# Patient Record
Sex: FEMALE | Race: WHITE | NOT HISPANIC OR LATINO | Employment: OTHER | URBAN - NONMETROPOLITAN AREA
[De-identification: names, ages, dates, MRNs, and addresses within clinical notes are randomized per-mention and may not be internally consistent; named-entity substitution may affect disease eponyms.]

---

## 2017-08-01 ENCOUNTER — LAB REQUISITION (OUTPATIENT)
Dept: LAB | Facility: HOSPITAL | Age: 72
End: 2017-08-01

## 2017-08-01 DIAGNOSIS — E11.9 TYPE 2 DIABETES MELLITUS WITHOUT COMPLICATIONS (HCC): ICD-10-CM

## 2017-08-01 DIAGNOSIS — I10 ESSENTIAL (PRIMARY) HYPERTENSION: ICD-10-CM

## 2017-08-01 LAB
ALBUMIN SERPL-MCNC: 3.8 G/DL (ref 3.4–4.8)
ALBUMIN/GLOB SERPL: 1.1 G/DL (ref 1.5–2.5)
ALP SERPL-CCNC: 149 U/L (ref 35–104)
ALT SERPL W P-5'-P-CCNC: 17 U/L (ref 10–36)
ANION GAP SERPL CALCULATED.3IONS-SCNC: 6.9 MMOL/L (ref 3.6–11.2)
AST SERPL-CCNC: 24 U/L (ref 10–30)
BILIRUB SERPL-MCNC: 0.3 MG/DL (ref 0.2–1.8)
BUN BLD-MCNC: 21 MG/DL (ref 7–21)
BUN/CREAT SERPL: 29.2 (ref 7–25)
CALCIUM SPEC-SCNC: 9.6 MG/DL (ref 7.7–10)
CHLORIDE SERPL-SCNC: 107 MMOL/L (ref 99–112)
CO2 SERPL-SCNC: 26.1 MMOL/L (ref 24.3–31.9)
CREAT BLD-MCNC: 0.72 MG/DL (ref 0.43–1.29)
DEPRECATED RDW RBC AUTO: 51.2 FL (ref 37–54)
ERYTHROCYTE [DISTWIDTH] IN BLOOD BY AUTOMATED COUNT: 18.7 % (ref 11.5–14.5)
GFR SERPL CREATININE-BSD FRML MDRD: 79 ML/MIN/1.73
GFR SERPL CREATININE-BSD FRML MDRD: 96 ML/MIN/1.73
GLOBULIN UR ELPH-MCNC: 3.5 GM/DL
GLUCOSE BLD-MCNC: 113 MG/DL (ref 70–110)
HBA1C MFR BLD: 5.7 % (ref 4.5–5.7)
HCT VFR BLD AUTO: 32.5 % (ref 37–47)
HGB BLD-MCNC: 10.7 G/DL (ref 12–16)
MCH RBC QN AUTO: 26.8 PG (ref 27–33)
MCHC RBC AUTO-ENTMCNC: 32.9 G/DL (ref 33–37)
MCV RBC AUTO: 81.3 FL (ref 80–94)
OSMOLALITY SERPL CALC.SUM OF ELEC: 283.2 MOSM/KG (ref 273–305)
PLATELET # BLD AUTO: 796 10*3/MM3 (ref 130–400)
PMV BLD AUTO: 10 FL (ref 6–10)
POTASSIUM BLD-SCNC: 4.6 MMOL/L (ref 3.5–5.3)
PROT SERPL-MCNC: 7.3 G/DL (ref 6–8)
RBC # BLD AUTO: 4 10*6/MM3 (ref 4.2–5.4)
SODIUM BLD-SCNC: 140 MMOL/L (ref 135–153)
WBC NRBC COR # BLD: 13.17 10*3/MM3 (ref 4.5–12.5)

## 2017-08-01 PROCEDURE — 85027 COMPLETE CBC AUTOMATED: CPT | Performed by: FAMILY MEDICINE

## 2017-08-01 PROCEDURE — 83036 HEMOGLOBIN GLYCOSYLATED A1C: CPT | Performed by: FAMILY MEDICINE

## 2017-08-01 PROCEDURE — 80053 COMPREHEN METABOLIC PANEL: CPT | Performed by: FAMILY MEDICINE

## 2017-08-02 ENCOUNTER — LAB REQUISITION (OUTPATIENT)
Dept: LAB | Facility: HOSPITAL | Age: 72
End: 2017-08-02

## 2017-08-02 DIAGNOSIS — R30.0 DYSURIA: ICD-10-CM

## 2017-08-02 LAB
BACTERIA UR QL AUTO: ABNORMAL /HPF
BILIRUB UR QL STRIP: NEGATIVE
CLARITY UR: ABNORMAL
COLOR UR: ABNORMAL
GLUCOSE UR STRIP-MCNC: NEGATIVE MG/DL
HGB UR QL STRIP.AUTO: NEGATIVE
HYALINE CASTS UR QL AUTO: ABNORMAL /LPF
KETONES UR QL STRIP: ABNORMAL
LEUKOCYTE ESTERASE UR QL STRIP.AUTO: ABNORMAL
NITRITE UR QL STRIP: POSITIVE
PH UR STRIP.AUTO: 7 [PH] (ref 5–8)
PROT UR QL STRIP: ABNORMAL
RBC # UR: ABNORMAL /HPF
REF LAB TEST METHOD: ABNORMAL
SP GR UR STRIP: 1.03 (ref 1–1.03)
SQUAMOUS #/AREA URNS HPF: ABNORMAL /HPF
UROBILINOGEN UR QL STRIP: ABNORMAL
WBC UR QL AUTO: ABNORMAL /HPF

## 2017-08-02 PROCEDURE — 81001 URINALYSIS AUTO W/SCOPE: CPT | Performed by: FAMILY MEDICINE

## 2017-08-02 PROCEDURE — 87077 CULTURE AEROBIC IDENTIFY: CPT | Performed by: FAMILY MEDICINE

## 2017-08-02 PROCEDURE — 87086 URINE CULTURE/COLONY COUNT: CPT | Performed by: FAMILY MEDICINE

## 2017-08-02 PROCEDURE — 81003 URINALYSIS AUTO W/O SCOPE: CPT | Performed by: FAMILY MEDICINE

## 2017-08-02 PROCEDURE — 87186 SC STD MICRODIL/AGAR DIL: CPT | Performed by: FAMILY MEDICINE

## 2017-08-05 LAB
BACTERIA SPEC AEROBE CULT: ABNORMAL

## 2017-08-09 ENCOUNTER — LAB REQUISITION (OUTPATIENT)
Dept: LAB | Facility: HOSPITAL | Age: 72
End: 2017-08-09

## 2017-08-09 DIAGNOSIS — D64.9 ANEMIA: ICD-10-CM

## 2017-08-09 DIAGNOSIS — E03.9 HYPOTHYROIDISM: ICD-10-CM

## 2017-08-15 ENCOUNTER — LAB REQUISITION (OUTPATIENT)
Dept: LAB | Facility: HOSPITAL | Age: 72
End: 2017-08-15

## 2017-08-15 DIAGNOSIS — I10 ESSENTIAL (PRIMARY) HYPERTENSION: ICD-10-CM

## 2017-08-15 LAB
25(OH)D3 SERPL-MCNC: 14 NG/ML
IRON 24H UR-MRATE: 78 MCG/DL (ref 49–151)
IRON SATN MFR SERPL: 24 % (ref 15–50)
TIBC SERPL-MCNC: 331 MCG/DL (ref 241–421)
TSH SERPL DL<=0.05 MIU/L-ACNC: 3.94 MIU/ML (ref 0.55–4.78)

## 2017-08-15 PROCEDURE — 83550 IRON BINDING TEST: CPT | Performed by: FAMILY MEDICINE

## 2017-08-15 PROCEDURE — 82306 VITAMIN D 25 HYDROXY: CPT | Performed by: FAMILY MEDICINE

## 2017-08-15 PROCEDURE — 84443 ASSAY THYROID STIM HORMONE: CPT | Performed by: FAMILY MEDICINE

## 2017-08-15 PROCEDURE — 83540 ASSAY OF IRON: CPT | Performed by: FAMILY MEDICINE

## 2017-08-17 ENCOUNTER — LAB REQUISITION (OUTPATIENT)
Dept: LAB | Facility: HOSPITAL | Age: 72
End: 2017-08-17

## 2017-08-17 DIAGNOSIS — D64.9 ANEMIA: ICD-10-CM

## 2017-08-17 LAB
25(OH)D3 SERPL-MCNC: 17 NG/ML
IRON 24H UR-MRATE: 53 MCG/DL (ref 49–151)
IRON SATN MFR SERPL: 15 % (ref 15–50)
TIBC SERPL-MCNC: 343 MCG/DL (ref 241–421)
TSH SERPL DL<=0.05 MIU/L-ACNC: 3.35 MIU/ML (ref 0.55–4.78)

## 2017-08-17 PROCEDURE — 83550 IRON BINDING TEST: CPT | Performed by: FAMILY MEDICINE

## 2017-08-17 PROCEDURE — 84443 ASSAY THYROID STIM HORMONE: CPT | Performed by: FAMILY MEDICINE

## 2017-08-17 PROCEDURE — 82306 VITAMIN D 25 HYDROXY: CPT | Performed by: FAMILY MEDICINE

## 2017-08-17 PROCEDURE — 83540 ASSAY OF IRON: CPT | Performed by: FAMILY MEDICINE

## 2017-08-22 ENCOUNTER — OFFICE VISIT (OUTPATIENT)
Dept: UROLOGY | Facility: CLINIC | Age: 72
End: 2017-08-22

## 2017-08-22 VITALS
SYSTOLIC BLOOD PRESSURE: 107 MMHG | WEIGHT: 150 LBS | HEIGHT: 65 IN | HEART RATE: 74 BPM | BODY MASS INDEX: 24.99 KG/M2 | DIASTOLIC BLOOD PRESSURE: 66 MMHG

## 2017-08-22 DIAGNOSIS — N30.01 ACUTE CYSTITIS WITH HEMATURIA: ICD-10-CM

## 2017-08-22 DIAGNOSIS — R31.0 GROSS HEMATURIA: Primary | ICD-10-CM

## 2017-08-22 PROCEDURE — 99204 OFFICE O/P NEW MOD 45 MIN: CPT | Performed by: UROLOGY

## 2017-08-22 RX ORDER — CITALOPRAM 40 MG/1
40 TABLET ORAL DAILY
COMMUNITY

## 2017-08-22 RX ORDER — TRAMADOL HYDROCHLORIDE 50 MG/1
50 TABLET ORAL EVERY 6 HOURS PRN
COMMUNITY

## 2017-08-22 RX ORDER — MEPERIDINE HYDROCHLORIDE 50 MG/1
50 TABLET ORAL EVERY 4 HOURS PRN
COMMUNITY

## 2017-08-22 RX ORDER — QUETIAPINE FUMARATE 50 MG/1
50 TABLET, FILM COATED ORAL NIGHTLY
COMMUNITY

## 2017-08-22 RX ORDER — GABAPENTIN 300 MG/1
300 CAPSULE ORAL 3 TIMES DAILY
COMMUNITY

## 2017-08-22 NOTE — PROGRESS NOTES
Chief Complaint:          Chief Complaint   Patient presents with   • Blood in Urine       HPI:   72 y.o. female.  72-year-old nursing home placed white female with gross hematuria described as 10 clots for day over the last 2 weeks is a remote history of bladder cancer she's not had an endoscopy in 25 years.  She apparently had a right femur fracture in the past.  In a wheelchair she's [facility].  She's here for workup of hematuria there is no burning or discharge but definitely has gross hematuria her urine cultures positive for Escherichia coli, Proteus mirabilis and enterococcus.  She was treated appropriately for her gross hematuria with antibiotics.  He also has known chronic kidney disease as well and vomiting and upper lower tract investigation I reviewed her history extensively.  This is a history of cancer with a new onset of gross hematuria in the face of not being worked up in the differential includes recent urinary tract infections        Past Medical History:        Past Medical History:   Diagnosis Date   • Bladder cancer    • Diabetes mellitus    • Hypertension          Current Meds:     Current Outpatient Prescriptions   Medication Sig Dispense Refill   • citalopram (CeleXA) 40 MG tablet Take 40 mg by mouth Daily.     • gabapentin (NEURONTIN) 300 MG capsule Take 300 mg by mouth 3 (Three) Times a Day.     • insulin aspart (novoLOG) 100 UNIT/ML injection Inject  under the skin 3 (Three) Times a Day Before Meals.     • meperidine (DEMEROL) 50 MG tablet Take 50 mg by mouth Every 4 (Four) Hours As Needed for Moderate Pain .     • QUEtiapine (SEROquel) 50 MG tablet Take 50 mg by mouth Every Night.     • traMADol (ULTRAM) 50 MG tablet Take 50 mg by mouth Every 6 (Six) Hours As Needed for Moderate Pain .       No current facility-administered medications for this visit.         Allergies:      Allergies   Allergen Reactions   • Iodinated Diagnostic Agents         Past Surgical History:     Past  Surgical History:   Procedure Laterality Date   • BLADDER SURGERY     • CHOLECYSTECTOMY     • HYSTERECTOMY     • STOMACH SURGERY           Social History:     Social History     Social History   • Marital status:      Spouse name: N/A   • Number of children: N/A   • Years of education: N/A     Occupational History   • Not on file.     Social History Main Topics   • Smoking status: Never Smoker   • Smokeless tobacco: Not on file   • Alcohol use No   • Drug use: No   • Sexual activity: Not on file     Other Topics Concern   • Not on file     Social History Narrative   • No narrative on file       Family History:     Family History   Problem Relation Age of Onset   • No Known Problems Father    • Cancer Mother    • Heart disease Mother        Review of Systems:     Review of Systems   Constitutional: Negative.  Negative for activity change, appetite change, chills, diaphoresis, fatigue and unexpected weight change.   HENT: Negative for congestion, dental problem, drooling, ear discharge, ear pain, facial swelling, hearing loss, mouth sores, nosebleeds, postnasal drip, rhinorrhea, sinus pressure, sneezing, sore throat, tinnitus, trouble swallowing and voice change.    Eyes: Negative.  Negative for photophobia, pain, discharge, redness, itching and visual disturbance.   Respiratory: Negative.  Negative for apnea, cough, choking, chest tightness, shortness of breath, wheezing and stridor.    Cardiovascular: Negative.  Negative for chest pain, palpitations and leg swelling.   Gastrointestinal: Negative.  Negative for abdominal distention, abdominal pain, anal bleeding, blood in stool, constipation, diarrhea, nausea, rectal pain and vomiting.   Endocrine: Negative.  Negative for cold intolerance, heat intolerance, polydipsia, polyphagia and polyuria.   Genitourinary: Positive for frequency, hematuria, pelvic pain and urgency.   Musculoskeletal: Negative.  Negative for arthralgias, back pain, gait problem, joint  swelling, myalgias, neck pain and neck stiffness.   Skin: Negative.  Negative for color change, pallor, rash and wound.   Allergic/Immunologic: Negative.  Negative for environmental allergies, food allergies and immunocompromised state.   Neurological: Negative.  Negative for dizziness, tremors, seizures, syncope, facial asymmetry, speech difficulty, weakness, light-headedness, numbness and headaches.   Hematological: Negative.  Negative for adenopathy. Does not bruise/bleed easily.   Psychiatric/Behavioral: Negative for agitation, behavioral problems, confusion, decreased concentration, dysphoric mood, hallucinations, self-injury, sleep disturbance and suicidal ideas. The patient is not nervous/anxious and is not hyperactive.    All other systems reviewed and are negative.      Physical Exam:     Physical Exam   Constitutional: She appears well-developed and well-nourished.   HENT:   Head: Normocephalic and atraumatic.   Right Ear: External ear normal.   Left Ear: External ear normal.   Mouth/Throat: Oropharynx is clear and moist.   Eyes: Conjunctivae are normal. Pupils are equal, round, and reactive to light.   Cardiovascular: Normal rate, regular rhythm, normal heart sounds and intact distal pulses.    Pulmonary/Chest: Effort normal and breath sounds normal.   Abdominal: Soft. Bowel sounds are normal. She exhibits no distension and no mass. There is no tenderness. There is no rebound and no guarding.   Genitourinary: No vaginal discharge found.   Musculoskeletal: Normal range of motion.   Neurological: She is alert. She has normal reflexes.   Skin: Skin is warm and dry.   Psychiatric: She has a normal mood and affect. Her behavior is normal. Judgment and thought content normal.       Procedure:       Assessment:   No diagnosis found.  No orders of the defined types were placed in this encounter.      Plan:   Gross hematuria-Hematuria-patient was diagnosed with hematuria.  We discussed the significance of  microscopic hematuria versus gross hematuria.  We discussed the presence or absence of the type of clotting identified including vermiform clots consistent with ureteral bleeding versus just pink tinged urine versus sharon clots.  We discussed the presence of urokinase in the urine which causes the clots to dissolve with time.  We discussed the fact that it takes only a very small amount of blood in the urine to make the urine very red appearing and therefore give one the impression that there is much more blood loss that is really present.  He discussed the use of both an upper and lower tract investigation.  I discussed the fact that an upper tract investigation includes a normal renal ultrasound with a significant risks of missing more subtle lesions.  Progressing to a CT scan without contrast and finally the CT scan with contrast being the gold standard to diagnose the small neoplasms.  We discussed the lower tract investigation consisting of a cystoscopy in many of the cases where the upper tract study is negative.  Discussed the fact that there is about a 96% chance of a negative workup with episodes of microscopic hematuria and with much greater in the face of gross hematuria.  We discussed the fact that this is a non-cumulative test.  In other words if there is hematuria next year I would recommend continuing to work up the condition because of the fact that neoplasms may be small at the first workup and easily are missed.  I discussed the differential diagnosis of hematuria including trauma, neoplasia, infection, etc.  We discussed the fact that if there is any history of chronic kidney disease or risk factors such as diabetes for contrast and noncontrast study will be utilized.  We will initiate an investigation.  She actually has a history of bladder neoplasia and a history of recurrent infections I'm undergo ahead and get an upper tract investigation followed by a cystoscopic examination.  Recurrent  urinary tract infections-patient has been referred and diagnosed with recurrent urinary tract infections.  We discussed the types of organisms that are found in the urinary tract indicating that the vast majority are results of the patient's own gastrointestinal margo.  We discussed how many of the antibiotics that are utilized can actually exacerbate these infections by creating resistant organisms and there is only a very few that are concentrated in the urine I did not affect the rectal reservoir nor cause recurrent yeast vaginitis.  We discussed the risk factors for recurrent infections being intercourse in younger patients and atrophic changes in older patients.  We discussed the symptoms that are found including pain, pressure, burning, frequency, urgency suprapubic pain and painful intercourse.  I discussed upper tract symptoms including fevers, chills, and indicated the workup would be much more aggressive if the patient were to present with recurrent infections in the face of upper tract symptomatology such as fever.  I discussed the history of vesicoureteral reflux in young patients and finally chronic renal scarring as a result of such.  I recommend concomitant probiotics with treatment with antibiotics to protect the rectal reservoir including over-the-counter yogurt preparations to sharon oral pills containing the appropriate probiotics.           This document has been electronically signed by JEFFREY SOLORIO MD August 22, 2017 11:05 AM

## 2017-08-30 ENCOUNTER — TELEPHONE (OUTPATIENT)
Dept: GASTROENTEROLOGY | Facility: CLINIC | Age: 72
End: 2017-08-30

## 2017-08-30 DIAGNOSIS — Z12.11 COLON CANCER SCREENING: ICD-10-CM

## 2017-08-30 DIAGNOSIS — R10.9 ABDOMINAL PAIN, UNSPECIFIED LOCATION: Primary | ICD-10-CM

## 2017-08-30 NOTE — TELEPHONE ENCOUNTER
Can you please put a case request in for patient to have a EGD/Colonoscopy? She is a resident of [facility] and will be using the [facility] prep. (they can get it there).    Thank you

## 2017-08-31 PROBLEM — R10.9 ABDOMINAL PAIN: Status: ACTIVE | Noted: 2017-08-31

## 2017-08-31 PROBLEM — Z12.11 COLON CANCER SCREENING: Status: ACTIVE | Noted: 2017-08-31

## 2017-09-28 ENCOUNTER — DOCUMENTATION (OUTPATIENT)
Dept: GASTROENTEROLOGY | Facility: CLINIC | Age: 72
End: 2017-09-28

## 2017-09-28 NOTE — PROGRESS NOTES
Called the [facility] to see why pt. Did not have EGD/Colonoscopy and her nurse ( Lilian) stated she refused it and did not want to have at this time.

## 2017-10-02 ENCOUNTER — APPOINTMENT (OUTPATIENT)
Dept: CT IMAGING | Facility: HOSPITAL | Age: 72
End: 2017-10-02
Attending: UROLOGY

## 2017-10-11 ENCOUNTER — HOSPITAL ENCOUNTER (OUTPATIENT)
Dept: CT IMAGING | Facility: HOSPITAL | Age: 72
Discharge: HOME | End: 2017-10-11
Attending: UROLOGY | Admitting: UROLOGY

## 2017-10-11 DIAGNOSIS — R31.0 GROSS HEMATURIA: ICD-10-CM

## 2017-10-11 PROCEDURE — 74176 CT ABD & PELVIS W/O CONTRAST: CPT | Performed by: RADIOLOGY

## 2017-10-11 PROCEDURE — 74176 CT ABD & PELVIS W/O CONTRAST: CPT

## 2017-11-09 ENCOUNTER — APPOINTMENT (OUTPATIENT)
Dept: CT IMAGING | Facility: HOSPITAL | Age: 72
End: 2017-11-09

## 2017-11-09 ENCOUNTER — HOSPITAL ENCOUNTER (EMERGENCY)
Facility: HOSPITAL | Age: 72
Discharge: HOME | End: 2017-11-09
Attending: EMERGENCY MEDICINE | Admitting: EMERGENCY MEDICINE

## 2017-11-09 VITALS
OXYGEN SATURATION: 95 % | HEART RATE: 65 BPM | RESPIRATION RATE: 18 BRPM | TEMPERATURE: 97.5 F | HEIGHT: 65 IN | SYSTOLIC BLOOD PRESSURE: 109 MMHG | BODY MASS INDEX: 26.39 KG/M2 | WEIGHT: 158.4 LBS | DIASTOLIC BLOOD PRESSURE: 59 MMHG

## 2017-11-09 DIAGNOSIS — I10 ESSENTIAL HYPERTENSION: Primary | ICD-10-CM

## 2017-11-09 PROCEDURE — 70450 CT HEAD/BRAIN W/O DYE: CPT

## 2017-11-09 PROCEDURE — 70450 CT HEAD/BRAIN W/O DYE: CPT | Performed by: RADIOLOGY

## 2017-11-09 PROCEDURE — 99284 EMERGENCY DEPT VISIT MOD MDM: CPT

## 2017-11-09 NOTE — ED NOTES
Report called to Josiah Guardado LPN at [facility].      Luna Hernandez RN  11/09/17 0621

## 2017-11-09 NOTE — ED NOTES
Spoke with olena at  ems, advised would be here shortly to transport patient back to nursing home     Eddie Talley  11/09/17 0712

## 2017-11-09 NOTE — ED NOTES
"PT STILL RESTING QUIET WITH EYES CLOSED, PT AWAKENS WHEN [facility] ARRIVE IN ROOM NAD NOTED, PT STATES \" HE HURTS ALITTLE UNABLE RATE PAIN ON SCALE 1-10\",     Toni Gilman RN  11/09/17 0414    "

## 2017-11-09 NOTE — ED NOTES
[facility] reports that Pt BP was 160/100 after administration of 0.1 of clonidine. Upon arrival to ER Pt /73, HR 69. Pt complains of frontal headache rating the pain 10/10. Dr. Navarro to the bedside at this time.      Luna Hernandez RN  11/09/17 0443

## 2017-11-09 NOTE — ED PROVIDER NOTES
Subjective   HPI Comments: Pt c/o headache.  Had /100 PTA,  No fever.  No CP, SOA,, palpitations.  Pt was given 0.2mg Clonidine Prior to coming to ED.    Patient is a 72 y.o. female presenting with hypertension.   History provided by:  Nursing home, patient and EMS personnel  Hypertension   Severity:  Severe  Onset quality:  Unable to specify  Duration: 7pm.  Progression:  Resolved  Chronicity:  Recurrent  Notable PTA blood pressures:  190/100  Context: normal sodium, not caffeine, not drug abuse, not herbal remedies, not medication change, not noncompliance, not OTC medications used and not stress    Relieved by:  Central alpha agonists  Worsened by:  Nothing  Ineffective treatments:  None tried  Associated symptoms: headaches and nausea    Associated symptoms: no abdominal pain, no anxiety, no blurred vision, no chest pain, no confusion, no dizziness, no ear pain, no epistaxis, no fatigue, no fever, no hematuria, no hypokalemia, no loss of consciousness, no neck pain, no palpitations, no peripheral edema, no shortness of breath, no syncope, no tinnitus, not vomiting and no weakness    Risk factors: no alcohol use, no cocaine use, no decongestant use, no diabetes, no kidney disease, no obesity, no prior aneurysm, no prior stroke, no PVD and no tobacco use        Review of Systems   Constitutional: Negative.  Negative for fatigue and fever.   HENT: Negative for ear pain, nosebleeds and tinnitus.    Eyes: Negative.  Negative for blurred vision, pain and visual disturbance.   Respiratory: Negative.  Negative for apnea, choking, chest tightness and shortness of breath.    Cardiovascular: Negative.  Negative for chest pain, palpitations and syncope.   Gastrointestinal: Positive for nausea. Negative for abdominal distention, abdominal pain and vomiting.   Endocrine: Negative.    Genitourinary: Negative.  Negative for hematuria.   Musculoskeletal: Negative.  Negative for neck pain.   Skin: Negative.     Allergic/Immunologic: Negative.    Neurological: Positive for syncope and headaches. Negative for dizziness, loss of consciousness, speech difficulty and weakness.   Hematological: Negative.    Psychiatric/Behavioral: Negative.  Negative for confusion. The patient is not nervous/anxious.    All other systems reviewed and are negative.      Past Medical History:   Diagnosis Date   • Bladder cancer    • Diabetes mellitus    • Hypertension        Allergies   Allergen Reactions   • Iodinated Diagnostic Agents        Past Surgical History:   Procedure Laterality Date   • BLADDER SURGERY     • CHOLECYSTECTOMY     • HYSTERECTOMY     • STOMACH SURGERY         Family History   Problem Relation Age of Onset   • No Known Problems Father    • Cancer Mother    • Heart disease Mother        Social History     Social History   • Marital status:      Spouse name: N/A   • Number of children: N/A   • Years of education: N/A     Social History Main Topics   • Smoking status: Never Smoker   • Smokeless tobacco: None   • Alcohol use No   • Drug use: No   • Sexual activity: Not Asked     Other Topics Concern   • None     Social History Narrative           Objective   Physical Exam   Constitutional: She appears well-developed and well-nourished. No distress.   HENT:   Head: Normocephalic and atraumatic.   Nose: Nose normal.   Mouth/Throat: Oropharynx is clear and moist.   Eyes: Conjunctivae and EOM are normal. Pupils are equal, round, and reactive to light. Right eye exhibits no discharge. Left eye exhibits no discharge. No scleral icterus.   Neck: Normal range of motion. No tracheal deviation present.   Cardiovascular: Normal rate, regular rhythm, normal heart sounds and intact distal pulses.  Exam reveals no gallop and no friction rub.    No murmur heard.  /73   Pulmonary/Chest: Effort normal and breath sounds normal. No stridor. No respiratory distress. She has no wheezes. She has no rales. She exhibits no tenderness.    Abdominal: Soft. She exhibits no distension and no mass. There is no tenderness. There is no guarding.   Musculoskeletal: Normal range of motion. She exhibits no deformity.   Neurological: She is alert.   Skin: Skin is warm and dry. There is pallor.   Psychiatric: She has a normal mood and affect. Her behavior is normal.   Nursing note and vitals reviewed.      Procedures         ED Course  ED Course     CT Head Without Contrast   ED Interpretation   CT head without IV contrast   Faxed report from [facility]   Impression: Age-appropriate atrophy and chronic microvascular   change.   Signed Hayden Santillan M.D.        Labs Reviewed - No data to display     Medication List      CONTINUE taking these medications          citalopram 40 MG tablet   Commonly known as:  CeleXA       gabapentin 300 MG capsule   Commonly known as:  NEURONTIN       insulin aspart 100 UNIT/ML injection   Commonly known as:  novoLOG       meperidine 50 MG tablet   Commonly known as:  DEMEROL       QUEtiapine 50 MG tablet   Commonly known as:  SEROquel       traMADol 50 MG tablet   Commonly known as:  ULTRAM                        MDM  Number of Diagnoses or Management Options  Essential hypertension: new and requires workup     Amount and/or Complexity of Data Reviewed  Tests in the radiology section of CPT®: ordered and reviewed  Obtain history from someone other than the patient: yes  Independent visualization of images, tracings, or specimens: yes    Risk of Complications, Morbidity, and/or Mortality  Presenting problems: high  Diagnostic procedures: high  Management options: moderate    Patient Progress  Patient progress: improved      Final diagnoses:   Essential hypertension            Camilo Navarro MD  11/09/17 0685

## 2017-11-09 NOTE — ED NOTES
Called [facility] for transport back to [facility]. Spoke with dispatcher Nahomi. She is toning the truck out. No ETA was given at this time.      Heather Symes  11/09/17 0645

## 2017-11-21 ENCOUNTER — LAB REQUISITION (OUTPATIENT)
Dept: LAB | Facility: HOSPITAL | Age: 72
End: 2017-11-21

## 2017-11-21 DIAGNOSIS — I10 ESSENTIAL (PRIMARY) HYPERTENSION: ICD-10-CM

## 2017-11-21 DIAGNOSIS — R30.0 DYSURIA: ICD-10-CM

## 2017-11-21 LAB
ALBUMIN UR-MCNC: 3.7 MG/L
ANION GAP SERPL CALCULATED.3IONS-SCNC: 5.1 MMOL/L (ref 3.6–11.2)
BACTERIA UR QL AUTO: ABNORMAL /HPF
BILIRUB UR QL STRIP: NEGATIVE
BUN BLD-MCNC: 18 MG/DL (ref 7–21)
BUN/CREAT SERPL: 29 (ref 7–25)
CALCIUM SPEC-SCNC: 8.9 MG/DL (ref 7.7–10)
CHLORIDE SERPL-SCNC: 107 MMOL/L (ref 99–112)
CLARITY UR: CLEAR
CO2 SERPL-SCNC: 27.9 MMOL/L (ref 24.3–31.9)
COLOR UR: YELLOW
CREAT BLD-MCNC: 0.62 MG/DL (ref 0.43–1.29)
GFR SERPL CREATININE-BSD FRML MDRD: 94 ML/MIN/1.73
GLUCOSE BLD-MCNC: 117 MG/DL (ref 70–110)
GLUCOSE UR STRIP-MCNC: NEGATIVE MG/DL
HGB UR QL STRIP.AUTO: NEGATIVE
HYALINE CASTS UR QL AUTO: ABNORMAL /LPF
KETONES UR QL STRIP: NEGATIVE
LEUKOCYTE ESTERASE UR QL STRIP.AUTO: ABNORMAL
MICRO TOTAL PROTEIN: 5.8 MG/DL
NITRITE UR QL STRIP: POSITIVE
OSMOLALITY SERPL CALC.SUM OF ELEC: 282.3 MOSM/KG (ref 273–305)
PH UR STRIP.AUTO: <=5 [PH] (ref 5–8)
POTASSIUM BLD-SCNC: 4.4 MMOL/L (ref 3.5–5.3)
PROT UR QL STRIP: NEGATIVE
RBC # UR: ABNORMAL /HPF
REF LAB TEST METHOD: ABNORMAL
SODIUM BLD-SCNC: 140 MMOL/L (ref 135–153)
SP GR UR STRIP: 1.02 (ref 1–1.03)
SQUAMOUS #/AREA URNS HPF: ABNORMAL /HPF
UROBILINOGEN UR QL STRIP: ABNORMAL
WBC UR QL AUTO: ABNORMAL /HPF

## 2017-11-21 PROCEDURE — 87077 CULTURE AEROBIC IDENTIFY: CPT | Performed by: FAMILY MEDICINE

## 2017-11-21 PROCEDURE — 87086 URINE CULTURE/COLONY COUNT: CPT | Performed by: FAMILY MEDICINE

## 2017-11-21 PROCEDURE — 87186 SC STD MICRODIL/AGAR DIL: CPT | Performed by: FAMILY MEDICINE

## 2017-11-21 PROCEDURE — 82043 UR ALBUMIN QUANTITATIVE: CPT | Performed by: FAMILY MEDICINE

## 2017-11-21 PROCEDURE — 81001 URINALYSIS AUTO W/SCOPE: CPT | Performed by: FAMILY MEDICINE

## 2017-11-21 PROCEDURE — 84156 ASSAY OF PROTEIN URINE: CPT | Performed by: FAMILY MEDICINE

## 2017-11-21 PROCEDURE — 80048 BASIC METABOLIC PNL TOTAL CA: CPT | Performed by: FAMILY MEDICINE

## 2017-11-28 LAB
BACTERIA SPEC AEROBE CULT: ABNORMAL
BACTERIA SPEC AEROBE CULT: ABNORMAL

## 2021-07-13 ENCOUNTER — HOSPITAL ENCOUNTER (INPATIENT)
Dept: HOSPITAL 79 - ER1 | Age: 76
LOS: 4 days | Discharge: HOME HEALTH | DRG: 193 | End: 2021-07-17
Attending: INTERNAL MEDICINE | Admitting: INTERNAL MEDICINE
Payer: MEDICARE

## 2021-07-13 VITALS — BODY MASS INDEX: 19.99 KG/M2 | WEIGHT: 120 LBS | HEIGHT: 65 IN

## 2021-07-13 DIAGNOSIS — E11.65: ICD-10-CM

## 2021-07-13 DIAGNOSIS — Z90.49: ICD-10-CM

## 2021-07-13 DIAGNOSIS — I44.7: ICD-10-CM

## 2021-07-13 DIAGNOSIS — Z20.822: ICD-10-CM

## 2021-07-13 DIAGNOSIS — Z79.899: ICD-10-CM

## 2021-07-13 DIAGNOSIS — Z90.89: ICD-10-CM

## 2021-07-13 DIAGNOSIS — M06.9: ICD-10-CM

## 2021-07-13 DIAGNOSIS — J18.9: Primary | ICD-10-CM

## 2021-07-13 DIAGNOSIS — I10: ICD-10-CM

## 2021-07-13 DIAGNOSIS — N30.00: ICD-10-CM

## 2021-07-13 DIAGNOSIS — B96.20: ICD-10-CM

## 2021-07-13 DIAGNOSIS — G93.41: ICD-10-CM

## 2021-07-13 LAB
BUN/CREATININE RATIO: 19 (ref 0–10)
HGB BLD-MCNC: 14.7 GM/DL (ref 12.3–15.3)
RED BLOOD COUNT: 5 M/UL (ref 4–5.1)
WHITE BLOOD COUNT: 12 K/UL (ref 4.5–11)

## 2021-07-13 PROCEDURE — G0378 HOSPITAL OBSERVATION PER HR: HCPCS

## 2021-07-13 PROCEDURE — U0002 COVID-19 LAB TEST NON-CDC: HCPCS

## 2021-07-16 LAB — BUN/CREATININE RATIO: 24 (ref 0–10)

## 2021-11-06 ENCOUNTER — HOSPITAL ENCOUNTER (EMERGENCY)
Dept: HOSPITAL 79 - ER1 | Age: 76
Discharge: HOME | End: 2021-11-06
Payer: COMMERCIAL

## 2021-11-06 DIAGNOSIS — I10: ICD-10-CM

## 2021-11-06 DIAGNOSIS — R05.9: ICD-10-CM

## 2021-11-06 DIAGNOSIS — N39.0: Primary | ICD-10-CM

## 2021-11-06 DIAGNOSIS — Z20.822: ICD-10-CM

## 2021-11-06 DIAGNOSIS — J44.9: ICD-10-CM

## 2021-11-06 DIAGNOSIS — I48.91: ICD-10-CM

## 2021-11-06 LAB
BUN/CREATININE RATIO: 18 (ref 0–10)
HGB BLD-MCNC: 14 GM/DL (ref 12.3–15.3)
RED BLOOD COUNT: 4.72 M/UL (ref 4–5.1)
WHITE BLOOD COUNT: 9.3 K/UL (ref 4.5–11)

## 2021-11-06 PROCEDURE — U0002 COVID-19 LAB TEST NON-CDC: HCPCS

## 2022-03-03 ENCOUNTER — HOSPITAL ENCOUNTER (INPATIENT)
Dept: HOSPITAL 79 - ER1 | Age: 77
LOS: 13 days | Discharge: HOME HEALTH | DRG: 871 | End: 2022-03-16
Attending: INTERNAL MEDICINE | Admitting: HOSPITALIST
Payer: MEDICARE

## 2022-03-03 VITALS — WEIGHT: 142 LBS | HEIGHT: 65 IN | BODY MASS INDEX: 23.66 KG/M2

## 2022-03-03 DIAGNOSIS — K56.7: ICD-10-CM

## 2022-03-03 DIAGNOSIS — J98.11: ICD-10-CM

## 2022-03-03 DIAGNOSIS — Z96.643: ICD-10-CM

## 2022-03-03 DIAGNOSIS — R79.89: ICD-10-CM

## 2022-03-03 DIAGNOSIS — J96.01: ICD-10-CM

## 2022-03-03 DIAGNOSIS — E11.9: ICD-10-CM

## 2022-03-03 DIAGNOSIS — I44.7: ICD-10-CM

## 2022-03-03 DIAGNOSIS — N17.9: ICD-10-CM

## 2022-03-03 DIAGNOSIS — E86.0: ICD-10-CM

## 2022-03-03 DIAGNOSIS — I11.0: ICD-10-CM

## 2022-03-03 DIAGNOSIS — J44.0: ICD-10-CM

## 2022-03-03 DIAGNOSIS — A09: ICD-10-CM

## 2022-03-03 DIAGNOSIS — K52.1: ICD-10-CM

## 2022-03-03 DIAGNOSIS — Z86.73: ICD-10-CM

## 2022-03-03 DIAGNOSIS — E87.6: ICD-10-CM

## 2022-03-03 DIAGNOSIS — J18.9: ICD-10-CM

## 2022-03-03 DIAGNOSIS — M06.9: ICD-10-CM

## 2022-03-03 DIAGNOSIS — Z99.81: ICD-10-CM

## 2022-03-03 DIAGNOSIS — G93.41: ICD-10-CM

## 2022-03-03 DIAGNOSIS — R53.81: ICD-10-CM

## 2022-03-03 DIAGNOSIS — A41.9: Primary | ICD-10-CM

## 2022-03-03 DIAGNOSIS — Z87.891: ICD-10-CM

## 2022-03-03 DIAGNOSIS — Z20.822: ICD-10-CM

## 2022-03-03 DIAGNOSIS — F31.9: ICD-10-CM

## 2022-03-03 DIAGNOSIS — Z90.49: ICD-10-CM

## 2022-03-03 DIAGNOSIS — Z96.653: ICD-10-CM

## 2022-03-03 DIAGNOSIS — R65.21: ICD-10-CM

## 2022-03-03 DIAGNOSIS — R91.8: ICD-10-CM

## 2022-03-03 DIAGNOSIS — T47.4X5A: ICD-10-CM

## 2022-03-03 DIAGNOSIS — I50.9: ICD-10-CM

## 2022-03-03 DIAGNOSIS — R32: ICD-10-CM

## 2022-03-03 LAB
BUN/CREATININE RATIO: 15 (ref 0–10)
BUN/CREATININE RATIO: 18 (ref 0–10)
HGB BLD-MCNC: 13.9 GM/DL (ref 12.3–15.3)
HGB BLD-MCNC: 13.9 GM/DL (ref 12.3–15.3)
RED BLOOD COUNT: 4.96 M/UL (ref 4–5.1)
RED BLOOD COUNT: 5 M/UL (ref 4–5.1)
WHITE BLOOD COUNT: 13.7 K/UL (ref 4.5–11)
WHITE BLOOD COUNT: 17.9 K/UL (ref 4.5–11)

## 2022-03-03 PROCEDURE — 3E043XZ INTRODUCTION OF VASOPRESSOR INTO CENTRAL VEIN, PERCUTANEOUS APPROACH: ICD-10-PCS | Performed by: HOSPITALIST

## 2022-03-03 PROCEDURE — 05HM33Z INSERTION OF INFUSION DEVICE INTO RIGHT INTERNAL JUGULAR VEIN, PERCUTANEOUS APPROACH: ICD-10-PCS

## 2022-03-03 PROCEDURE — 3E04329 INTRODUCTION OF OTHER ANTI-INFECTIVE INTO CENTRAL VEIN, PERCUTANEOUS APPROACH: ICD-10-PCS | Performed by: HOSPITALIST

## 2022-03-03 PROCEDURE — B543ZZA ULTRASONOGRAPHY OF RIGHT JUGULAR VEINS, GUIDANCE: ICD-10-PCS

## 2022-03-04 LAB
BUN/CREATININE RATIO: 23 (ref 0–10)
HGB BLD-MCNC: 11.7 GM/DL (ref 12.3–15.3)
RED BLOOD COUNT: 4.17 M/UL (ref 4–5.1)
WHITE BLOOD COUNT: 11.3 K/UL (ref 4.5–11)

## 2022-03-05 LAB
BUN/CREATININE RATIO: 22 (ref 0–10)
HGB BLD-MCNC: 10.2 GM/DL (ref 12.3–15.3)
RED BLOOD COUNT: 3.58 M/UL (ref 4–5.1)
WHITE BLOOD COUNT: 10.3 K/UL (ref 4.5–11)

## 2022-03-06 LAB — BUN/CREATININE RATIO: 11 (ref 0–10)

## 2022-03-06 NOTE — NUR
AGREE WITH PREVIOUS RN ASSESSMENT, PATIENT ORIENTED TO ROOM AND FLOOR,
CURRENTLY ON PHONE WITH FAMILY AND COMFORTABLE.

## 2022-03-07 LAB — BUN/CREATININE RATIO: 7 (ref 0–10)

## 2022-03-08 LAB — BUN/CREATININE RATIO: 9 (ref 0–10)

## 2022-03-09 LAB
BUN/CREATININE RATIO: 15 (ref 0–10)
HGB BLD-MCNC: 9.8 GM/DL (ref 12.3–15.3)
RED BLOOD COUNT: 3.55 M/UL (ref 4–5.1)
WHITE BLOOD COUNT: 9.6 K/UL (ref 4.5–11)

## 2022-03-10 LAB
BUN/CREATININE RATIO: 17 (ref 0–10)
HGB BLD-MCNC: 10.7 GM/DL (ref 12.3–15.3)
RED BLOOD COUNT: 3.84 M/UL (ref 4–5.1)
WHITE BLOOD COUNT: 11.1 K/UL (ref 4.5–11)

## 2022-03-11 LAB
BUN/CREATININE RATIO: 18 (ref 0–10)
HGB BLD-MCNC: 11 GM/DL (ref 12.3–15.3)
RED BLOOD COUNT: 3.95 M/UL (ref 4–5.1)
WHITE BLOOD COUNT: 11.6 K/UL (ref 4.5–11)

## 2022-03-12 LAB
BUN/CREATININE RATIO: 22 (ref 0–10)
HGB BLD-MCNC: 11.2 GM/DL (ref 12.3–15.3)
RED BLOOD COUNT: 4.06 M/UL (ref 4–5.1)
WHITE BLOOD COUNT: 15.9 K/UL (ref 4.5–11)

## 2022-03-13 LAB
BUN/CREATININE RATIO: 21 (ref 0–10)
HGB BLD-MCNC: 11 GM/DL (ref 12.3–15.3)
RED BLOOD COUNT: 3.91 M/UL (ref 4–5.1)
WHITE BLOOD COUNT: 14.9 K/UL (ref 4.5–11)

## 2022-03-14 LAB
BUN/CREATININE RATIO: 26 (ref 0–10)
HGB BLD-MCNC: 10.4 GM/DL (ref 12.3–15.3)
RED BLOOD COUNT: 3.76 M/UL (ref 4–5.1)
WHITE BLOOD COUNT: 11.1 K/UL (ref 4.5–11)

## 2022-03-14 NOTE — NUR
1340- PATIENT HAVING COMPLAINTS WITH CHEST PAIN. PATIENT STATES IT IS A DULL
PAIN AND ONLY IN THE CENTER OF HER CHEST. VITAL SIGNS STABLE. 113/54, 18, 74,
96%. MD NOTIFIED WITH NEW ORDERS FOR TYLENOL 650MG Q4 HOURS PRN. WILL CONTINUE
TO MONITOR.

## 2022-03-15 LAB
BUN/CREATININE RATIO: 31 (ref 0–10)
HGB BLD-MCNC: 10.9 GM/DL (ref 12.3–15.3)
RED BLOOD COUNT: 3.93 M/UL (ref 4–5.1)
WHITE BLOOD COUNT: 10 K/UL (ref 4.5–11)

## 2022-03-16 LAB
BUN/CREATININE RATIO: 41 (ref 0–10)
ORGANISM ID: (no result)